# Patient Record
Sex: MALE | Race: BLACK OR AFRICAN AMERICAN | NOT HISPANIC OR LATINO | URBAN - METROPOLITAN AREA
[De-identification: names, ages, dates, MRNs, and addresses within clinical notes are randomized per-mention and may not be internally consistent; named-entity substitution may affect disease eponyms.]

---
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
## 2022-10-12 NOTE — PRE-OP CHECKLIST, PEDIATRIC - HAIR REMOVAL
hair removal not indicated Elidel Counseling: Patient may experience a mild burning sensation during topical application. Elidel is not approved in children less than 2 years of age. There have been case reports of hematologic and skin malignancies in patients using topical calcineurin inhibitors although causality is questionable.

## 2024-05-15 NOTE — ED PEDIATRIC TRIAGE NOTE - ESI TRIAGE ACUITY LEVEL, MLM
Spinal Block    Date/Time: 5/14/2024 10:17 PM    Performed by: Nickie Goetz MD  Authorized by: Nickie Goetz MD    Patient Location:  OR  Indication: primary anesthetic    Pre anesthesia checklist Patient Identified (2 criteria), Timeout Performed, Resuscitation equipment available, IV Bolus, Allergies confirmed, Monitors applied, Coagulation Status, Block site marked (if applicable), Sedation given (if needed), Block Plan Confirmed, Drugs/Solutions Labeled, IV Access functioning, Necessary Block Equipment Present, Consent Verified and Aseptic technique   Patient Position:  Sitting  Prep:  Povidone iodine  Max Sterile Barrier Technique:  Hand washing, cap/mask, sterile gloves and sterile drape  Monitoring:  Continuous pulse ox, EKG and NIBP  Approach:  Midline  Local Infiltration:  1% Lidocaine  Location:  L3-4  Injection Technique:  Single-shot  Needle Type:  Pencil-tip    Assessment:   Number of Attempts: 1   Events: CSF from needle   Procedure Assessment: patient tolerated procedure well with no complications  Start Time:  5/14/2024 10:17 PM  Stop Time: 5/14/2024 10:20 PM       3